# Patient Record
Sex: MALE | Race: WHITE | URBAN - METROPOLITAN AREA
[De-identification: names, ages, dates, MRNs, and addresses within clinical notes are randomized per-mention and may not be internally consistent; named-entity substitution may affect disease eponyms.]

---

## 2018-07-23 ENCOUNTER — THERAPY VISIT (OUTPATIENT)
Dept: PHYSICAL THERAPY | Facility: CLINIC | Age: 23
End: 2018-07-23
Payer: COMMERCIAL

## 2018-07-23 DIAGNOSIS — M54.41 BILATERAL LOW BACK PAIN WITH RIGHT-SIDED SCIATICA: Primary | ICD-10-CM

## 2018-07-23 PROCEDURE — 97162 PT EVAL MOD COMPLEX 30 MIN: CPT | Mod: GP | Performed by: PHYSICAL THERAPIST

## 2018-07-23 PROCEDURE — 97110 THERAPEUTIC EXERCISES: CPT | Mod: GP | Performed by: PHYSICAL THERAPIST

## 2018-07-23 PROCEDURE — 97112 NEUROMUSCULAR REEDUCATION: CPT | Mod: GP | Performed by: PHYSICAL THERAPIST

## 2018-07-23 NOTE — MR AVS SNAPSHOT
"              After Visit Summary   7/23/2018    Julian Montenegro    MRN: [id number]           Patient Information     Date Of Birth          1995        Visit Information        Provider Department      7/23/2018 7:40 AM Rachel Otto, CHARLEY [facility]        Today's Diagnoses     Bilateral low back pain with right-sided sciatica    -  1       Follow-ups after your visit        Your next 10 appointments already scheduled     Jul 30, 2018  7:40 AM CDT   ANN Spine with Rachel Otto PT   [facility] ([facility]  )    [address] [facility] [id number] [phone]            Aug 07, 2018  7:30 AM CDT   ANN Spine with Rachel Otto PT   [facility] ([facility]  )    [address] [facility] [id number] [phone]              Who to contact     If you have questions or need follow up information about today's clinic visit or your schedule please contact [facility] directly at [phone].  Normal or non-critical lab and imaging results will be communicated to you by MyChart, letter or phone within 4 business days after the clinic has received the results. If you do not hear from us within 7 days, please contact the clinic through brick&mobilehart or phone. If you have a critical or abnormal lab result, we will notify you by phone as soon as possible.  Submit refill requests through Lolay or call your pharmacy and they will forward the refill request to us. Please allow 3 business days for your refill to be completed.          Additional Information About Your Visit        MyChart Information     Lolay lets you send messages to your doctor, view your test results, renew your prescriptions, schedule appointments and more. To sign up, go to [email or web address] . Click on \"Log in\" on the left side of the screen, which will take you to " "the Welcome page. Then click on \"Sign up Now\" on the right side of the page.     You will be asked to enter the access code listed below, as well as some personal information. Please follow the directions to create your username and password.     Your access code is: [email or web address]  Expires: 10/21/2018 11:28 PM     Your access code will  in 90 days. If you need help or a new code, please call your [place] clinic or [phone].        Care EveryWhere ID     This is your Care EveryWhere ID. This could be used by other organizations to access your [place] medical records  [id number]         Blood Pressure from Last 3 Encounters:   No data found for BP    Weight from Last 3 Encounters:   No data found for Wt              We Performed the Following     ANN Inital Eval Report     Neuromuscular Re-Education     PT Eval, Moderate Complexity (75905)     Therapeutic Exercises        Primary Care Provider Office Phone # Fax #    Murphy Braden Swenson [phone]       ARTHRITIS RHEUM CONSUL PA [address] [facility] [address]  [facility] [address]        Equal Access to Services     [facility]: Hadii aad ku hadasho Soelizabethali, waaxda luqadaha, qaybta kaalmada tl, robert almanza . So [facility] [phone].    ATENCIÓN: Si habla español, tiene a dhillon disposición servicios gratuitos de asistencia lingüística. Bradley al [phone].    We comply with applicable federal civil rights laws and [place] laws. We do not discriminate on the basis of race, color, national origin, age, disability, sex, sexual orientation, or gender identity.            Thank you!     Thank you for choosing [facility]  for your care. Our goal is always to provide you with excellent care. Hearing back from our patients is one way we can continue to improve our services. Please take a few minutes to complete the written survey that you may receive in the mail after your visit " with us. Thank you!             Your Updated Medication List - Protect others around you: Learn how to safely use, store and throw away your medicines at www.disposemymeds.org.      Notice  As of 7/23/2018 11:28 PM    You have not been prescribed any medications.

## 2018-07-23 NOTE — LETTER
[facility]  [address]  [facility] [address]  [phone]    2018    Re: Julian Montenegro   :   1995  MRN:  [id number]   REFERRING PHYSICIAN:   Murphy Swenson  [place] [facility]  Date of Initial Evaluation:  2018  Visits:  Rxs Used: 1  Reason for Referral:  Bilateral low back pain with right-sided sciatica    EVALUATION SUMMARY  [facility] Initial Evaluation  Subjective:  Patient is a 23 year old male presenting with rehab back hpi. The history is provided by the patient. No  was used.   Julian Montenegro is a 23 year old male with a lumbar condition.  Condition occurred with:  Insidious onset.  Condition occurred: during recreation/sport.  This is a new condition  Patient reports that in early 2018, he went paintballing for his brother's birthday and after this he started to have lower back pain. He started to get pain into the right buttock and leg a few weeks after the onset of his lower back pain. MD order from 18. He also states that he hurt his C7 in  and has pain ever since. He was Dx with RA and tried several medications without relief. He is now seeing a new doctor and is having further testing to determine the reason for his whole body pain as it may not be RA.. .    Patient reports pain:  Central lumbar spine, lumbar spine left and lumbar spine right.  Radiates to:  Gluteals right and thigh right (posterior right thigh).  Pain is described as aching and sharp (constant ache in the lower back and intermittent sharp into the R leg) and is constant   Associated symptoms:  Numbness, tingling and loss of motion/stiffness (N&T into the entire all 4 extremities for the last 5 years or so). Pain is the same all the time.  Symptoms are exacerbated by standing, bending, lifting, other and certain positions (stand blossom 45' with 6-7/10 pain, 8-9/10 pain to  arise from sitting, 7-8/10 pain to don shoes/socks) and relieved by other and muscle relaxants (TENS).  Since onset symptoms are unchanged.        General health as reported by patient is poor.  Pertinent medical history includes:  Numbness/tingling (? RA-being treated for this to determine cause).  Medical allergies: no.  Other surgeries include:  No.  Current medications:  Sleep medication and anti-inflammatory.  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  Primary job tasks include:  Lifting, repetitive tasks, prolonged standing and other (carrying).  Barriers include:  None as reported by the patient.  Red flags:  None as reported by the patient.  Objective:  System  Lumbar/SI Evaluation  ROM:    AROM Lumbar:   Flexion:          Fingertips to knees and  increase R buttock/lower back  Ext:                    Mod loss and slight ache in R lower back and central ache   Side Bend:        Left:     Right:   Rotation:           Left:     Right:   Side Glide:        Left:  WNL and no change    Right:  WNL and no change        Lumbar Myotomes:    L4 (Ankle DF):  Left:  5    Right:  5  L5 (Great Toe Ext): Left: 5    Right: 5   S1 (Toe Raise):  Left: 5    Right: 5-  Lumbar DTR's:  normal  Cord Signs:  normal  Neural Tension/Mobility:    Left side:  Slump positive.  Left side:SLR or Femoral Nerve  negative.   Right side:   Slump and SLR positive.  Right side:   Femoral Nerve  negative.   Lumbar Palpation:    Tenderness present at Left:    Erector Spinae  Tenderness present at Right: Quadratus Lumborum and Erector Spinae    Librado Lumbar Evaluation  Posture:  Sitting: poor  Standing: fair  Lordosis: Reduced  Lateral Shift: no  Correction of Posture: no effect  Test Movements:  FIS: During: increases  After: no worse    Repeat FIS: During: increases  After: worse    EIS: During: increases  After: no worse    Repeat EIS: During: increases  After: no effect    DEBBIE: During: no  effect  After: no effect    Repeat DEBBIE: During: no effect  After: no effect    EIL: During: no effect  After: no effect    Repeat EIL: During: no effect  After: better               Assessment/Plan:    Patient is a 23 year old male with lumbar complaints.    Patient has the following significant findings with corresponding treatment plan.                Diagnosis 1:  Lumbar derangement  Pain -  manual therapy, self management, education, directional preference exercise and home program  Decreased ROM/flexibility - manual therapy, therapeutic exercise and home program  Decreased strength - therapeutic exercise, therapeutic activities and home program  Impaired muscle performance - neuro re-education and home program  Decreased function - therapeutic activities and home program  Impaired posture - neuro re-education and home program  Therapy Evaluation Codes:   1) History comprised of:   Personal factors that impact the plan of care:      Past/current experiences and Time since onset of symptoms.    Comorbidity factors that impact the plan of care are:      Numbness/tingling.     Medications impacting care: None.  2) Examination of Body Systems comprised of:   Body structures and functions that impact the plan of care:      Lumbar spine.   Activity limitations that impact the plan of care are:      Bending, Dressing, Lifting, Squatting/kneeling and Standing.  3) Clinical presentation characteristics are:   Evolving/Changing.  4) Decision-Making    Moderate complexity using standardized patient assessment instrument and/or measureable assessment of functional outcome.  Cumulative Therapy Evaluation is: Moderate complexity.    Previous and current functional limitations:  (See Goal Flow Sheet for this information)    Short term and Long term goals: (See Goal Flow Sheet for this information)     Communication ability:  Patient appears to be able to clearly communicate and understand verbal and written communication and  follow directions correctly.  Treatment Explanation - The following has been discussed with the patient:   RX ordered/plan of care  Anticipated outcomes  Possible risks and side effects  This patient would benefit from PT intervention to resume normal activities.   Rehab potential is good.    Frequency:  1 X week, once daily  Duration:  for 10 weeks  Discharge Plan:  Achieve all LTG.  Independent in home treatment program.  Reach maximal therapeutic benefit.  Please refer to the daily flowsheet for treatment today, total treatment time and time spent performing 1:1 timed codes.     Thank you for your referral.    INQUIRIES  Therapist: Rachel Otto, PT  [facility]  [address]  [facility] [address]  Phone: [phone]  Fax: [phone]

## 2018-07-23 NOTE — PROGRESS NOTES
[place] [facility] Initial Evaluation  Subjective:  Patient is a 23 year old male presenting with rehab back hpi. The history is provided by the patient. No  was used.   Julian Montenegro is a 23 year old male with a lumbar condition.  Condition occurred with:  Insidious onset.  Condition occurred: during recreation/sport.  This is a new condition  Patient reports that in early June of 2018, he went paintballing for his brother's birthday and after this he started to have lower back pain. He started to get pain into the right buttock and leg a few weeks after the onset of his lower back pain. MD order from 7-12-18. He also states that he hurt his C7 in 2013 and has pain ever since. He was Dx with RA and tried several medications without relief. He is now seeing a new doctor and is having further testing to determine the reason for his whole body pain as it may not be RA.. .    Patient reports pain:  Central lumbar spine, lumbar spine left and lumbar spine right.  Radiates to:  Gluteals right and thigh right (posterior right thigh).  Pain is described as aching and sharp (constant ache in the lower back and intermittent sharp into the R leg) and is constant   Associated symptoms:  Numbness, tingling and loss of motion/stiffness (N&T into the entire all 4 extremities for the last 5 years or so). Pain is the same all the time.  Symptoms are exacerbated by standing, bending, lifting, other and certain positions (stand blossom 45' with 6-7/10 pain, 8-9/10 pain to arise from sitting, 7-8/10 pain to don shoes/socks) and relieved by other and muscle relaxants (TENS).  Since onset symptoms are unchanged.        General health as reported by patient is poor.  Pertinent medical history includes:  Numbness/tingling (? RA-being treated for this to determine cause).  Medical allergies: no.  Other surgeries include:  No.  Current medications:  Sleep medication and anti-inflammatory.  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  Primary job tasks include:  Lifting, repetitive tasks, prolonged standing and other (carrying).    Barriers include:  None as reported by the patient.    Red flags:  None as reported by the patient.                        Objective:  System         Lumbar/SI Evaluation  ROM:    AROM Lumbar:   Flexion:          Fingertips to knees and  increase R buttock/lower back  Ext:                    Mod loss and slight ache in R lower back and central ache   Side Bend:        Left:     Right:   Rotation:           Left:     Right:   Side Glide:        Left:  WNL and no change    Right:  WNL and no change          Lumbar Myotomes:        L4 (Ankle DF):  Left:  5    Right:  5  L5 (Great Toe Ext): Left: 5    Right: 5   S1 (Toe Raise):  Left: 5    Right: 5-  Lumbar DTR's:  normal      Cord Signs:  normal      Neural Tension/Mobility:    Left side:  Slump positive.  Left side:SLR or Femoral Nerve  negative.   Right side:   Slump and SLR positive.  Right side:   Femoral Nerve  negative.   Lumbar Palpation:    Tenderness present at Left:    Erector Spinae  Tenderness present at Right: Quadratus Lumborum and Erector Spinae                                                       Librado Lumbar Evaluation    Posture:  Sitting: poor  Standing: fair  Lordosis: Reduced  Lateral Shift: no  Correction of Posture: no effect      Test Movements:  FIS: During: increases  After: no worse    Repeat FIS: During: increases  After: worse    EIS: During: increases  After: no worse    Repeat EIS: During: increases  After: no effect    DEBBIE: During: no effect  After: no effect    Repeat DEBBIE: During: no effect  After: no effect    EIL: During: no effect  After: no effect    Repeat EIL: During: no effect  After: better                                                   ROS    Assessment/Plan:    Patient is a 23 year old male with lumbar complaints.    Patient has the following  significant findings with corresponding treatment plan.                Diagnosis 1:  Lumbar derangement  Pain -  manual therapy, self management, education, directional preference exercise and home program  Decreased ROM/flexibility - manual therapy, therapeutic exercise and home program  Decreased strength - therapeutic exercise, therapeutic activities and home program  Impaired muscle performance - neuro re-education and home program  Decreased function - therapeutic activities and home program  Impaired posture - neuro re-education and home program    Therapy Evaluation Codes:   1) History comprised of:   Personal factors that impact the plan of care:      Past/current experiences and Time since onset of symptoms.    Comorbidity factors that impact the plan of care are:      Numbness/tingling.     Medications impacting care: None.  2) Examination of Body Systems comprised of:   Body structures and functions that impact the plan of care:      Lumbar spine.   Activity limitations that impact the plan of care are:      Bending, Dressing, Lifting, Squatting/kneeling and Standing.  3) Clinical presentation characteristics are:   Evolving/Changing.  4) Decision-Making    Moderate complexity using standardized patient assessment instrument and/or measureable assessment of functional outcome.  Cumulative Therapy Evaluation is: Moderate complexity.    Previous and current functional limitations:  (See Goal Flow Sheet for this information)    Short term and Long term goals: (See Goal Flow Sheet for this information)     Communication ability:  Patient appears to be able to clearly communicate and understand verbal and written communication and follow directions correctly.  Treatment Explanation - The following has been discussed with the patient:   RX ordered/plan of care  Anticipated outcomes  Possible risks and side effects  This patient would benefit from PT intervention to resume normal activities.   Rehab potential is  good.    Frequency:  1 X week, once daily  Duration:  for 10 weeks  Discharge Plan:  Achieve all LTG.  Independent in home treatment program.  Reach maximal therapeutic benefit.    Please refer to the daily flowsheet for treatment today, total treatment time and time spent performing 1:1 timed codes.

## 2018-07-30 ENCOUNTER — THERAPY VISIT (OUTPATIENT)
Dept: PHYSICAL THERAPY | Facility: CLINIC | Age: 23
End: 2018-07-30
Payer: COMMERCIAL

## 2018-07-30 DIAGNOSIS — M54.41 BILATERAL LOW BACK PAIN WITH RIGHT-SIDED SCIATICA: ICD-10-CM

## 2018-07-30 PROCEDURE — 97140 MANUAL THERAPY 1/> REGIONS: CPT | Mod: GP | Performed by: PHYSICAL THERAPIST

## 2018-07-30 PROCEDURE — 97110 THERAPEUTIC EXERCISES: CPT | Mod: GP | Performed by: PHYSICAL THERAPIST

## 2018-07-30 NOTE — PROGRESS NOTES
"Subjective:  HPI                    Objective:  System    Physical Exam    General     ROS    Assessment/Plan:    SUBJECTIVE  Subjective changes as noted by pt:  Patient reports that he is a bit better since his last therapy session. He didn't get his exercises done the last 2 days as is was out most of the weekend.He had done his press ups about 4-5 times a day and feels better for about 20-30' after doing them.       Current Pain level: 4/10   Changes in function:  Yes (See Goal flowsheet attached for changes in current functional level)     Adverse reaction to treatment or activity:  None    OBJECTIVE  Changes in objective findings:  Standing LROM: FB with fingertips to just below knees and increase, BB mod loss and \"feels good,\" and B side glides min loss and slight increase with both movements. Pain decrease and better following REIL and manual lumbar mobs. Patient still needs reminders for posture correction.        ASSESSMENT  Julian continues to require intervention to meet STG and LTG's: PT  Patient's symptoms are resolving.  Response to therapy has shown an improvement in  pain level and function  Progress made towards STG/LTG?  Yes (See Goal flowsheet attached for updates on achievement of STG and LTG)    PLAN  Current treatment program is being advanced to more complex exercises.  The following procedures have been added:  manual therapy    PTA/ATC plan:  N/A    Please refer to the daily flowsheet for treatment today, total treatment time and time spent performing 1:1 timed codes.          "

## 2018-07-30 NOTE — MR AVS SNAPSHOT
After Visit Summary   7/30/2018    Julian Montenegro    MRN: [id number]           Patient Information     Date Of Birth          1995        Visit Information        Provider Department      7/30/2018 7:40 AM Rachel Otto, PT [facility]        Today's Diagnoses     Bilateral low back pain with right-sided sciatica           Follow-ups after your visit        Your next 10 appointments already scheduled     Aug 07, 2018  7:30 AM CDT   ANN Spine with Rachel Otto PT   [facility] ([facility]  )    [address] [facility] [address]   [phone]            Aug 15, 2018  8:20 AM CDT   ANN Spine with Rachel Otto PT   [facility] ([facility]  )    [address] [facility] [address]   [phone]            Aug 21, 2018  8:10 AM CDT   ANN Spine with Rachel Otto PT   [facility] ([facility]  )    [address] [facility] [address]   [phone]            Aug 28, 2018  7:30 AM CDT   ANN Spine with Rachel Otto PT   [facility]    [address] [facility] [address]   [phone]              Who to contact     If you have questions or need follow up information about today's clinic visit or your schedule please contact [facility] directly at [phone].  Normal or non-critical lab and imaging results will be communicated to you by MyChart, letter or phone within 4 business days after the clinic has received the results. If you do not hear from us within 7 days, please contact the clinic through MyChart or phone. If you have a critical or abnormal lab result, we will notify you by phone as soon as possible.  Submit refill requests through Frontify or call your pharmacy and they will forward the  "refill request to us. Please allow 3 business days for your refill to be completed.          Additional Information About Your Visit        MyChart Information     PanelClaw lets you send messages to your doctor, view your test results, renew your prescriptions, schedule appointments and more. To sign up, go to [email or web address] . Click on \"Log in\" on the left side of the screen, which will take you to the Welcome page. Then click on \"Sign up Now\" on the right side of the page.     You will be asked to enter the access code listed below, as well as some personal information. Please follow the directions to create your username and password.     Your access code is: [email or web address]  Expires: 10/21/2018 11:28 PM     Your access code will  in 90 days. If you need help or a new code, please call your [place] clinic or [phone].        Care EveryWhere ID     This is your Care EveryWhere ID. This could be used by other organizations to access your [place] medical records  [id number]         Blood Pressure from Last 3 Encounters:   No data found for BP    Weight from Last 3 Encounters:   No data found for Wt              We Performed the Following     Manual Ther Tech, 1+Regions, EA 15 min     Therapeutic Exercises        Primary Care Provider Office Phone # Fax #    Murphy Braden Swenson [phone]       ARTHRITIS RHEUM CONSUL PA [address]  [place] [address]        Equal Access to Services     [facility] : Hadii aad ku hadasho Soomaali, waaxda luqadaha, qaybta kaalmada adeegyada, robert almanza . So [facility] [phone].    ATENCIÓN: Si habla español, tiene a dhillon disposición servicios gratuitos de asistencia lingüística. Llame al [phone].    We comply with applicable federal civil rights laws and [place] laws. We do not discriminate on the basis of race, color, national origin, age, disability, sex, sexual orientation, or gender " identity.            Thank you!     Thank you for choosing [facility]  for your care. Our goal is always to provide you with excellent care. Hearing back from our patients is one way we can continue to improve our services. Please take a few minutes to complete the written survey that you may receive in the mail after your visit with us. Thank you!             Your Updated Medication List - Protect others around you: Learn how to safely use, store and throw away your medicines at www.disposemymeds.org.      Notice  As of 7/30/2018  2:29 PM    You have not been prescribed any medications.

## 2018-12-08 PROBLEM — M54.41 BILATERAL LOW BACK PAIN WITH RIGHT-SIDED SCIATICA: Status: RESOLVED | Noted: 2018-07-23 | Resolved: 2018-12-08

## 2018-12-09 NOTE — PROGRESS NOTES
Subjective:  HPI                    Objective:  System    Physical Exam    General     ROS    Assessment/Plan:    DISCHARGE REPORT    Progress reporting period is from 7-23-18 to 7-30-18.     SUBJECTIVE      Current Pain level: 4/10            ;   ,     Patient has failed to return to therapy so current objective findings are unknown.  The subjective and objective information are from the last SOAP note on this patient.    OBJECTIVE         ASSESSMENT/PLAN  Updated problem list and treatment plan: Diagnosis 1:  Lumbar derangement  Pain -  manual therapy, self management, education, directional preference exercise and home program  Decreased ROM/flexibility - manual therapy, therapeutic exercise and home program  Decreased joint mobility - manual therapy, therapeutic exercise and home program  Impaired muscle performance - neuro re-education and home program  Decreased function - therapeutic activities and home program  Impaired posture - neuro re-education and home program  STG/LTGs have been met or progress has been made towards goals:  Yes (See Goal flow sheet completed today.)  Assessment of Progress: The patient has not returned to therapy. Current status is unknown.  Self Management Plans:  Patient has been instructed in a home treatment program.  Patient  has been instructed in self management of symptoms.    Julian continues to require the following intervention to meet STG and LTG's:   The patient failed to complete scheduled/ordered appointments so current information is unknown.  We will discharge this patient from PT.    Recommendations:      Please refer to the daily flowsheet for treatment today, total treatment time and time spent performing 1:1 timed codes.